# Patient Record
Sex: FEMALE | Race: BLACK OR AFRICAN AMERICAN | Employment: FULL TIME | URBAN - METROPOLITAN AREA
[De-identification: names, ages, dates, MRNs, and addresses within clinical notes are randomized per-mention and may not be internally consistent; named-entity substitution may affect disease eponyms.]

---

## 2021-03-26 ENCOUNTER — TRANSCRIBE ORDER (OUTPATIENT)
Dept: SCHEDULING | Age: 36
End: 2021-03-26

## 2021-03-26 DIAGNOSIS — D25.9 FIBROID, UTERINE: Primary | ICD-10-CM

## 2021-03-29 ENCOUNTER — HOSPITAL ENCOUNTER (OUTPATIENT)
Dept: MRI IMAGING | Age: 36
Discharge: HOME | End: 2021-03-29
Attending: STUDENT IN AN ORGANIZED HEALTH CARE EDUCATION/TRAINING PROGRAM
Payer: COMMERCIAL

## 2021-03-29 VITALS — WEIGHT: 163 LBS

## 2021-03-29 DIAGNOSIS — D25.9 FIBROID, UTERINE: ICD-10-CM

## 2021-03-29 PROCEDURE — 72197 MRI PELVIS W/O & W/DYE: CPT

## 2021-03-29 PROCEDURE — 74011250636 HC RX REV CODE- 250/636: Performed by: STUDENT IN AN ORGANIZED HEALTH CARE EDUCATION/TRAINING PROGRAM

## 2021-03-29 PROCEDURE — A9575 INJ GADOTERATE MEGLUMI 0.1ML: HCPCS | Performed by: STUDENT IN AN ORGANIZED HEALTH CARE EDUCATION/TRAINING PROGRAM

## 2021-03-29 RX ORDER — GADOTERATE MEGLUMINE 376.9 MG/ML
15 INJECTION INTRAVENOUS ONCE
Status: COMPLETED | OUTPATIENT
Start: 2021-03-29 | End: 2021-03-29

## 2021-03-29 RX ADMIN — GADOTERATE MEGLUMINE 15 ML: 376.9 INJECTION INTRAVENOUS at 17:03

## 2021-04-01 ENCOUNTER — HOSPITAL ENCOUNTER (OUTPATIENT)
Dept: INTERVENTIONAL RADIOLOGY/VASCULAR | Age: 36
Discharge: HOME | End: 2021-04-01
Attending: RADIOLOGY | Admitting: RADIOLOGY
Payer: COMMERCIAL

## 2021-04-01 VITALS
SYSTOLIC BLOOD PRESSURE: 131 MMHG | HEART RATE: 72 BPM | WEIGHT: 163 LBS | DIASTOLIC BLOOD PRESSURE: 76 MMHG | RESPIRATION RATE: 21 BRPM | TEMPERATURE: 99.5 F | BODY MASS INDEX: 28.88 KG/M2 | HEIGHT: 63 IN | OXYGEN SATURATION: 99 %

## 2021-04-01 DIAGNOSIS — D25.9 UTERINE FIBROID: ICD-10-CM

## 2021-04-01 LAB — HCG UR QL: NEGATIVE

## 2021-04-01 PROCEDURE — C1769 GUIDE WIRE: HCPCS

## 2021-04-01 PROCEDURE — C1894 INTRO/SHEATH, NON-LASER: HCPCS

## 2021-04-01 PROCEDURE — 74011250637 HC RX REV CODE- 250/637: Performed by: RADIOLOGY

## 2021-04-01 PROCEDURE — 77030003504 HC NDL BIOP TISS COOK -A

## 2021-04-01 PROCEDURE — 2709999900 HC NON-CHARGEABLE SUPPLY

## 2021-04-01 PROCEDURE — C1887 CATHETER, GUIDING: HCPCS

## 2021-04-01 PROCEDURE — 37243 VASC EMBOLIZE/OCCLUDE ORGAN: CPT

## 2021-04-01 PROCEDURE — 77030019569 HC BND COMPR RAD TERU -B

## 2021-04-01 PROCEDURE — 77030016064 HC PARTIC EMBSPHR BSPH -D

## 2021-04-01 PROCEDURE — 77030019698 HC SYR ANGI MDLON MRTM -A

## 2021-04-01 PROCEDURE — 77030003394 HC NDL ART COOK -A

## 2021-04-01 PROCEDURE — 74011000636 HC RX REV CODE- 636: Performed by: RADIOLOGY

## 2021-04-01 PROCEDURE — 74011000250 HC RX REV CODE- 250: Performed by: RADIOLOGY

## 2021-04-01 PROCEDURE — 77030014021 HC SYR ANGI FIX LOK MRTM -A

## 2021-04-01 PROCEDURE — 81025 URINE PREGNANCY TEST: CPT

## 2021-04-01 PROCEDURE — 74011250636 HC RX REV CODE- 250/636

## 2021-04-01 PROCEDURE — 74011250636 HC RX REV CODE- 250/636: Performed by: RADIOLOGY

## 2021-04-01 RX ORDER — FLUMAZENIL 0.1 MG/ML
0.5 INJECTION INTRAVENOUS ONCE
Status: DISCONTINUED | OUTPATIENT
Start: 2021-04-01 | End: 2021-04-01

## 2021-04-01 RX ORDER — IBUPROFEN 400 MG/1
800 TABLET ORAL
Status: COMPLETED | OUTPATIENT
Start: 2021-04-01 | End: 2021-04-01

## 2021-04-01 RX ORDER — NALOXONE HYDROCHLORIDE 0.4 MG/ML
0.4 INJECTION, SOLUTION INTRAMUSCULAR; INTRAVENOUS; SUBCUTANEOUS AS NEEDED
Status: DISCONTINUED | OUTPATIENT
Start: 2021-04-01 | End: 2021-04-01

## 2021-04-01 RX ORDER — ROPIVACAINE HYDROCHLORIDE 5 MG/ML
20 INJECTION, SOLUTION EPIDURAL; INFILTRATION; PERINEURAL
Status: COMPLETED | OUTPATIENT
Start: 2021-04-01 | End: 2021-04-01

## 2021-04-01 RX ORDER — VERAPAMIL HYDROCHLORIDE 2.5 MG/ML
2.5 INJECTION, SOLUTION INTRAVENOUS
Status: COMPLETED | OUTPATIENT
Start: 2021-04-01 | End: 2021-04-01

## 2021-04-01 RX ORDER — LIDOCAINE HCL/PF 100 MG/5ML
200 SYRINGE (ML) INTRAVENOUS
Status: COMPLETED | OUTPATIENT
Start: 2021-04-01 | End: 2021-04-01

## 2021-04-01 RX ORDER — MIDAZOLAM HYDROCHLORIDE 1 MG/ML
10 INJECTION, SOLUTION INTRAMUSCULAR; INTRAVENOUS
Status: DISCONTINUED | OUTPATIENT
Start: 2021-04-01 | End: 2021-04-01

## 2021-04-01 RX ORDER — OXYCODONE HCL 10 MG/1
10 TABLET, FILM COATED, EXTENDED RELEASE ORAL ONCE
Status: COMPLETED | OUTPATIENT
Start: 2021-04-01 | End: 2021-04-01

## 2021-04-01 RX ORDER — LIDOCAINE HCL/PF 100 MG/5ML
200 SYRINGE (ML) INTRAVENOUS ONCE
Status: COMPLETED | OUTPATIENT
Start: 2021-04-01 | End: 2021-04-01

## 2021-04-01 RX ORDER — NORETHINDRONE ACETATE AND ETHINYL ESTRADIOL 1; .02 MG/1; MG/1
1 TABLET ORAL DAILY
COMMUNITY

## 2021-04-01 RX ORDER — FENTANYL CITRATE 50 UG/ML
200 INJECTION, SOLUTION INTRAMUSCULAR; INTRAVENOUS
Status: DISCONTINUED | OUTPATIENT
Start: 2021-04-01 | End: 2021-04-01

## 2021-04-01 RX ORDER — MIDAZOLAM HYDROCHLORIDE 1 MG/ML
INJECTION, SOLUTION INTRAMUSCULAR; INTRAVENOUS
Status: COMPLETED
Start: 2021-04-01 | End: 2021-04-01

## 2021-04-01 RX ORDER — KETOROLAC TROMETHAMINE 30 MG/ML
30 INJECTION, SOLUTION INTRAMUSCULAR; INTRAVENOUS
Status: COMPLETED | OUTPATIENT
Start: 2021-04-01 | End: 2021-04-01

## 2021-04-01 RX ORDER — ONDANSETRON 2 MG/ML
8 INJECTION INTRAMUSCULAR; INTRAVENOUS AS NEEDED
Status: DISCONTINUED | OUTPATIENT
Start: 2021-04-01 | End: 2021-04-01

## 2021-04-01 RX ORDER — HYDROMORPHONE HYDROCHLORIDE 1 MG/ML
1 INJECTION, SOLUTION INTRAMUSCULAR; INTRAVENOUS; SUBCUTANEOUS
Status: DISCONTINUED | OUTPATIENT
Start: 2021-04-01 | End: 2021-04-01

## 2021-04-01 RX ORDER — LIDOCAINE 40 MG/G
CREAM TOPICAL
Status: COMPLETED | OUTPATIENT
Start: 2021-04-01 | End: 2021-04-01

## 2021-04-01 RX ORDER — ACETAMINOPHEN 500 MG
1000 TABLET ORAL
Status: COMPLETED | OUTPATIENT
Start: 2021-04-01 | End: 2021-04-01

## 2021-04-01 RX ORDER — SODIUM CHLORIDE 9 MG/ML
100 INJECTION, SOLUTION INTRAVENOUS CONTINUOUS
Status: DISCONTINUED | OUTPATIENT
Start: 2021-04-01 | End: 2021-04-01

## 2021-04-01 RX ORDER — TRIAMCINOLONE ACETONIDE 40 MG/ML
40 INJECTION, SUSPENSION INTRA-ARTICULAR; INTRAMUSCULAR ONCE
Status: COMPLETED | OUTPATIENT
Start: 2021-04-01 | End: 2021-04-01

## 2021-04-01 RX ORDER — HEPARIN SODIUM 1000 [USP'U]/ML
3000 INJECTION, SOLUTION INTRAVENOUS; SUBCUTANEOUS
Status: COMPLETED | OUTPATIENT
Start: 2021-04-01 | End: 2021-04-01

## 2021-04-01 RX ORDER — FENTANYL CITRATE 50 UG/ML
INJECTION, SOLUTION INTRAMUSCULAR; INTRAVENOUS
Status: COMPLETED
Start: 2021-04-01 | End: 2021-04-01

## 2021-04-01 RX ORDER — LIDOCAINE HYDROCHLORIDE 20 MG/ML
20 INJECTION, SOLUTION INFILTRATION; PERINEURAL
Status: COMPLETED | OUTPATIENT
Start: 2021-04-01 | End: 2021-04-01

## 2021-04-01 RX ORDER — SODIUM CHLORIDE 9 MG/ML
25 INJECTION, SOLUTION INTRAVENOUS CONTINUOUS
Status: DISCONTINUED | OUTPATIENT
Start: 2021-04-01 | End: 2021-04-01

## 2021-04-01 RX ADMIN — MIDAZOLAM HYDROCHLORIDE 0.5 MG: 1 INJECTION, SOLUTION INTRAMUSCULAR; INTRAVENOUS at 12:07

## 2021-04-01 RX ADMIN — WATER 2 G: 1 INJECTION INTRAMUSCULAR; INTRAVENOUS; SUBCUTANEOUS at 08:35

## 2021-04-01 RX ADMIN — OXYCODONE HYDROCHLORIDE 10 MG: 10 TABLET, FILM COATED, EXTENDED RELEASE ORAL at 08:35

## 2021-04-01 RX ADMIN — LIDOCAINE HYDROCHLORIDE 9 ML: 20 INJECTION, SOLUTION INFILTRATION; PERINEURAL at 11:13

## 2021-04-01 RX ADMIN — MIDAZOLAM HYDROCHLORIDE 0.5 MG: 1 INJECTION, SOLUTION INTRAMUSCULAR; INTRAVENOUS at 12:05

## 2021-04-01 RX ADMIN — MIDAZOLAM HYDROCHLORIDE 0.5 MG: 1 INJECTION, SOLUTION INTRAMUSCULAR; INTRAVENOUS at 11:10

## 2021-04-01 RX ADMIN — IOPAMIDOL 128 ML: 612 INJECTION, SOLUTION INTRAVENOUS at 12:10

## 2021-04-01 RX ADMIN — MIDAZOLAM HYDROCHLORIDE 0.5 MG: 1 INJECTION, SOLUTION INTRAMUSCULAR; INTRAVENOUS at 11:13

## 2021-04-01 RX ADMIN — METHYLPREDNISOLONE SODIUM SUCCINATE 125 MG: 125 INJECTION, POWDER, FOR SOLUTION INTRAMUSCULAR; INTRAVENOUS at 08:30

## 2021-04-01 RX ADMIN — IBUPROFEN 800 MG: 400 TABLET, FILM COATED ORAL at 13:26

## 2021-04-01 RX ADMIN — NITROGLYCERIN 1 INCH: 20 OINTMENT TOPICAL at 07:55

## 2021-04-01 RX ADMIN — LIDOCAINE HYDROCHLORIDE 100 MG: 20 INJECTION, SOLUTION INTRAVENOUS at 11:57

## 2021-04-01 RX ADMIN — SODIUM CHLORIDE 100 ML/HR: 900 INJECTION, SOLUTION INTRAVENOUS at 08:30

## 2021-04-01 RX ADMIN — ROPIVACAINE HYDROCHLORIDE 20 ML: 5 INJECTION, SOLUTION EPIDURAL; INFILTRATION; PERINEURAL at 12:05

## 2021-04-01 RX ADMIN — MIDAZOLAM HYDROCHLORIDE 1 MG: 1 INJECTION, SOLUTION INTRAMUSCULAR; INTRAVENOUS at 11:00

## 2021-04-01 RX ADMIN — LIDOCAINE HYDROCHLORIDE 100 MG: 20 INJECTION, SOLUTION INTRAVENOUS at 11:40

## 2021-04-01 RX ADMIN — MIDAZOLAM HYDROCHLORIDE 1 MG: 1 INJECTION, SOLUTION INTRAMUSCULAR; INTRAVENOUS at 10:55

## 2021-04-01 RX ADMIN — FENTANYL CITRATE 25 MCG: 50 INJECTION, SOLUTION INTRAMUSCULAR; INTRAVENOUS at 11:59

## 2021-04-01 RX ADMIN — MIDAZOLAM HYDROCHLORIDE 0.5 MG: 1 INJECTION, SOLUTION INTRAMUSCULAR; INTRAVENOUS at 12:02

## 2021-04-01 RX ADMIN — VERAPAMIL HYDROCHLORIDE 1.25 MG: 2.5 INJECTION, SOLUTION INTRAVENOUS at 11:16

## 2021-04-01 RX ADMIN — MIDAZOLAM HYDROCHLORIDE 0.5 MG: 1 INJECTION, SOLUTION INTRAMUSCULAR; INTRAVENOUS at 11:43

## 2021-04-01 RX ADMIN — HYDROMORPHONE HYDROCHLORIDE 1 MG: 1 INJECTION, SOLUTION INTRAMUSCULAR; INTRAVENOUS; SUBCUTANEOUS at 14:02

## 2021-04-01 RX ADMIN — ACETAMINOPHEN 1000 MG: 500 TABLET ORAL at 12:50

## 2021-04-01 RX ADMIN — FENTANYL CITRATE 50 MCG: 50 INJECTION, SOLUTION INTRAMUSCULAR; INTRAVENOUS at 10:55

## 2021-04-01 RX ADMIN — TRIAMCINOLONE ACETONIDE 40 MG: 40 INJECTION, SUSPENSION INTRA-ARTICULAR; INTRAMUSCULAR at 12:05

## 2021-04-01 RX ADMIN — NITROGLYCERIN 100 MCG: 10 INJECTION INTRAVENOUS at 11:16

## 2021-04-01 RX ADMIN — KETOROLAC TROMETHAMINE 30 MG: 30 INJECTION, SOLUTION INTRAMUSCULAR at 08:30

## 2021-04-01 RX ADMIN — LIDOCAINE 4%: 4 CREAM TOPICAL at 07:55

## 2021-04-01 RX ADMIN — ONDANSETRON 8 MG: 2 INJECTION INTRAMUSCULAR; INTRAVENOUS at 08:30

## 2021-04-01 RX ADMIN — FENTANYL CITRATE 12.5 MCG: 50 INJECTION, SOLUTION INTRAMUSCULAR; INTRAVENOUS at 11:54

## 2021-04-01 RX ADMIN — HEPARIN SODIUM 1500 UNITS: 1000 INJECTION INTRAVENOUS; SUBCUTANEOUS at 11:16

## 2021-04-01 RX ADMIN — FENTANYL CITRATE 12.5 MCG: 50 INJECTION, SOLUTION INTRAMUSCULAR; INTRAVENOUS at 12:04

## 2021-04-01 NOTE — DISCHARGE INSTRUCTIONS
[facility] [address] Uterine Fibroid Embolization Discharge Instructions    General Information:   Interventional Radiology can now treat fibroids using a well developed, non-surgical technique called embolization.   Embolization means to block the blood supply. This technique has been used for decades to shrink tumors elsewhere in the body and is now being applied to the treatment of fibroids with great success. Following embolization, the fibroid is deprived of its blood supply and it slowly shrinks. As the fibroid shrinks, excessive menstrual bleeding slows and pain often disappears. This procedure often helps patients to avoid hysterectomy and myomectomy. You will spend the night at the hospital after your procedure. You will be given a pain pump that you operate with a push button during your hospital stay. Home Care Instructions: You should not take a bath or go swimming for two weeks. Showering is acceptable. Please resume your normal activity slowly to see how you tolerate activity. Sexual or Athletic activity will not harm you, but may be uncomfortable. Every person recovers differently. If you are comfortable, then you may resume activity. Follow-Up Instructions:  Please see your ordering doctor as he/she has requested. Your OB/GYN may ask you to have a follow up ultrasound, or the interventional radiologist may ask you to have a follow up MRI to see how the fibroids responded to the embolization. This is a doctors preference, and is not indicated in all cases.     Call If:   Some patients may experience increased cramping pain and a low grade fever for 3-5 days after the procedure, which is normal.  You should call your Physician and/or the Radiology Nurse if you should develop any of the following: a foul smelling discharge, abnormal cramping or bleeding that you do not think is related to your normal menses, fever, and change in the character of your normal menses, or anything that concerns you. Radial Cardiac Catheterization / Angiography Discharge Instructions    It is normal to feel tired the first couple days. Take it easy and follow the physicians instructions. CHECK THE CATHETER INSERTION SITE DAILY:  Remove the wrist dressing 24 hours after the procedure. You may shower 24 hours after the procedure. Wash with soap and water and pat dry. Gentle cleaning of the site with soap and water is sufficient, cover with a dry clean dressing or bandage. Do not apply creams or powders to the area. No soaking the wrist for 3 days. Leave the puncture site open to air after 24 hours post-procedure. CALL THE PHYSICIAN:  If the site becomes red, swollen or feels warm to the touch. If there is bleeding or drainage or if there is unusual pain at the radial site. If there is any minor oozing, you may apply a band-aid and remove after 12 hours. If the bleeding continues, hold pressure with the middle finger against the puncture site and the thumb against the back of the wrist, call 911 to be transported to the hospital.  DO NOT DRIVE YOURSELF, [place] [address]. ACTIVITY:  For the first 24 hours do not manipulate the wrist.  No lifting, pushing or pulling over 3-5 pounds with the affected wrist for 7 days and no straining the insertion site. Do not lift grocery bags or the garbage can, do not run the vacuum  or  for 7 days. Start with short walks as in the hospital and gradually increase as tolerated each day. It is recommended to walk 30 minutes 5-7 days per week. Follow your physicians instructions on activity. Avoid walking outside in extremes of heat or cold. Walk inside when it is cold and windy or hot and humid. THINGS TO KEEP IN MIND:  No driving for at least 24 hours, or as designated by your physician.   Limit the number of times you go up and down the stairs  Take rests and pace yourself with activity. Be careful and do not strain with bowel movements. MEDICATIONS:  Take all medications as prescribed. Call your physician if you have any questions. Keep an updated list of your medications with you at all times and give a list to your physician and pharmacist.    To Reach Us:  Side effects of sedation medications and other medications used today have been reviewed. Notify us of nausea, itching, hives, dizziness, or anything else out of the ordinary. Should you experience any of these significant changes, please call [phone] between the hours of 7:30 am and 10 pm or [phone] after hours.  After hours, ask the  to page the [address] Technologist, and describe the problem to the technologist.        Patient Signature:  Date: 4/1/2021  Discharging Nurse: Christine Burkitt, RN

## 2021-04-01 NOTE — H&P
Interventional and Vascular Radiology History and Physical    Patient: Fiorella Hatch 37 y.o. female       Chief Complaint: No chief complaint on file. History of Present Illness: fibroids, menorrhagia     History:    History reviewed. No pertinent past medical history. History reviewed. No pertinent family history.   Social History     Socioeconomic History    Marital status: SINGLE     Spouse name: Not on file    Number of children: Not on file    Years of education: Not on file    Highest education level: Not on file   Occupational History    Not on file   Social Needs    Financial resource strain: Not on file    Food insecurity     Worry: Not on file     Inability: Not on file    Transportation needs     Medical: Not on file     Non-medical: Not on file   Tobacco Use    Smoking status: Unknown If Ever Smoked    Smokeless tobacco: Current User   Substance and Sexual Activity    Alcohol use: Not on file     Comment: once/week    Drug use: Not on file    Sexual activity: Not on file   Lifestyle    Physical activity     Days per week: Not on file     Minutes per session: Not on file    Stress: Not on file   Relationships    Social connections     Talks on phone: Not on file     Gets together: Not on file     Attends Orthodoxy service: Not on file     Active member of club or organization: Not on file     Attends meetings of clubs or organizations: Not on file     Relationship status: Not on file    Intimate partner violence     Fear of current or ex partner: Not on file     Emotionally abused: Not on file     Physically abused: Not on file     Forced sexual activity: Not on file   Other Topics Concern     Service Not Asked    Blood Transfusions Not Asked    Caffeine Concern Not Asked    Occupational Exposure Not Asked   [place] Hazards Not Asked    Sleep Concern Not Asked    Stress Concern Not Asked    Weight Concern Not Asked    Special Diet Not Asked    Back Care Not Asked    Exercise Not Asked    Bike Helmet Not Asked    Seat Belt Not Asked    Self-Exams Not Asked   Social History Narrative    Not on file       Allergies: No Known Allergies    Current Medications:  Current Facility-Administered Medications   Medication Dose Route Frequency    HYDROmorphone (PF) (DILAUDID) injection 1 mg  1 mg IntraVENous Q2H PRN    ondansetron (ZOFRAN) injection 8 mg  8 mg IntraVENous PRN    promethazine (PHENERGAN) 12.5 mg in 0.9% sodium chloride 50 mL IVPB  12.5 mg IntraVENous PRN    0.9% sodium chloride infusion  100 mL/hr IntraVENous CONTINUOUS    fentaNYL citrate (PF) injection 200 mcg  200 mcg IntraVENous Multiple    midazolam (VERSED) injection 10 mg  10 mg IntraVENous Multiple    0.9% sodium chloride infusion  25 mL/hr IntraVENous CONTINUOUS    flumazeniL (ROMAZICON) 0.1 mg/mL injection 0.5 mg  0.5 mg IntraVENous ONCE    naloxone (NARCAN) injection 0.4 mg  0.4 mg IntraVENous PRN        Physical Exam:  Blood pressure 116/69, pulse 64, temperature 99.5 °F (37.5 °C), resp. rate 18, height 5' 3\" (1.6 m), weight 73.9 kg (163 lb), SpO2 98 %, not currently breastfeeding. LUNG: clear to auscultation bilaterally, HEART: regular rate and rhythm, S1, S2 normal, no murmur, click, rub or gallop      Alerts:      Laboratory:    No results for input(s): HGB, HCT, WBC, PLT, INR, BUN, CREA, K, CRCLT, HGBEXT, HCTEXT, PLTEXT, INREXT in the last 72 hours. No lab exists for component: PTT, PT      Plan of Care/Planned Procedure:  Risks, benefits, and alternatives reviewed with patient and she agrees to proceed with the procedure. Conscious sedation will be performed with IV fentanyl and versed.  Plan is for uterine artery embolization       Doris Rodriguez MD

## 2021-04-01 NOTE — PROGRESS NOTES
0730 Pt arrives to angio department accompanied by sister for transradial uterine fibroid embolization procedure. All assessments completed and consent was reviewed. Education given was regarding procedure, IV conscious sedation, post-procedure care and  management/follow-up. Opportunity for questions was provided and all questions and concerns were addressed. Dr. Mallory Tena assessed LUE radial and ulnar pulses for radial access. POC pregnancy test negative. 1620 Pt has ambulated up to BR three times without dizziness or nausea. VSS  Has had abdominal cramping which has ranged from a 6; now it's down to a 2. Has received dilaudid 1mg IV, 1000mg tylenol po, and 800mg motrin po postprocedure. She has tolerated po without problems. LUE TR band removed with dsg dry and intact at the site. Reviewed discharged instructions with pt, as did Dr. Mallory Tena. Pt verbalized understanding of instructions. Pt discharged to home with belongings, her medications prescribed by Dr. Mallory Tena, and instructions as well as Dr. Mallory Tena contact card via w/c with sister. Pt has LUE immobilizer intact to wrist at discharge and emphasized use of it for the next three days. Also reiterated activity restrictions with pt.

## 2021-04-02 ENCOUNTER — APPOINTMENT (OUTPATIENT)
Dept: CT IMAGING | Age: 36
End: 2021-04-02
Attending: STUDENT IN AN ORGANIZED HEALTH CARE EDUCATION/TRAINING PROGRAM
Payer: COMMERCIAL

## 2021-04-02 ENCOUNTER — HOSPITAL ENCOUNTER (EMERGENCY)
Age: 36
Discharge: HOME | End: 2021-04-02
Attending: EMERGENCY MEDICINE | Admitting: EMERGENCY MEDICINE
Payer: COMMERCIAL

## 2021-04-02 VITALS
SYSTOLIC BLOOD PRESSURE: 165 MMHG | DIASTOLIC BLOOD PRESSURE: 95 MMHG | RESPIRATION RATE: 18 BRPM | HEART RATE: 57 BPM | OXYGEN SATURATION: 100 % | TEMPERATURE: 98 F

## 2021-04-02 DIAGNOSIS — R10.814 LEFT LOWER QUADRANT ABDOMINAL TENDERNESS WITHOUT REBOUND TENDERNESS: ICD-10-CM

## 2021-04-02 DIAGNOSIS — R10.2 PELVIC PAIN: Primary | ICD-10-CM

## 2021-04-02 DIAGNOSIS — R16.0 LIVER MASS: ICD-10-CM

## 2021-04-02 LAB
ALBUMIN SERPL-MCNC: 3.7 G/DL (ref 3.5–5)
ALBUMIN/GLOB SERPL: 0.9 {RATIO} (ref 1.1–2.2)
ALP SERPL-CCNC: 52 U/L (ref 45–117)
ALT SERPL-CCNC: 23 U/L (ref 12–78)
ANION GAP SERPL CALC-SCNC: 6 MMOL/L (ref 5–15)
APPEARANCE UR: CLEAR
AST SERPL-CCNC: 16 U/L (ref 15–37)
BACTERIA URNS QL MICRO: NEGATIVE /HPF
BASOPHILS # BLD: 0 K/UL (ref 0–0.1)
BASOPHILS NFR BLD: 0 % (ref 0–1)
BILIRUB SERPL-MCNC: 0.2 MG/DL (ref 0.2–1)
BILIRUB UR QL: NEGATIVE
BUN SERPL-MCNC: 9 MG/DL (ref 6–20)
BUN/CREAT SERPL: 12 (ref 12–20)
CALCIUM SERPL-MCNC: 8.9 MG/DL (ref 8.5–10.1)
CHLORIDE SERPL-SCNC: 105 MMOL/L (ref 97–108)
CO2 SERPL-SCNC: 24 MMOL/L (ref 21–32)
COLOR UR: ABNORMAL
CREAT SERPL-MCNC: 0.78 MG/DL (ref 0.55–1.02)
DIFFERENTIAL METHOD BLD: ABNORMAL
EOSINOPHIL # BLD: 0 K/UL (ref 0–0.4)
EOSINOPHIL NFR BLD: 0 % (ref 0–7)
EPITH CASTS URNS QL MICRO: ABNORMAL /LPF
ERYTHROCYTE [DISTWIDTH] IN BLOOD BY AUTOMATED COUNT: 12.7 % (ref 11.5–14.5)
GLOBULIN SER CALC-MCNC: 4 G/DL (ref 2–4)
GLUCOSE SERPL-MCNC: 125 MG/DL (ref 65–100)
GLUCOSE UR STRIP.AUTO-MCNC: NEGATIVE MG/DL
HCT VFR BLD AUTO: 38.3 % (ref 35–47)
HGB BLD-MCNC: 12.6 G/DL (ref 11.5–16)
HGB UR QL STRIP: ABNORMAL
IMM GRANULOCYTES # BLD AUTO: 0.1 K/UL (ref 0–0.04)
IMM GRANULOCYTES NFR BLD AUTO: 0 % (ref 0–0.5)
KETONES UR QL STRIP.AUTO: NEGATIVE MG/DL
LEUKOCYTE ESTERASE UR QL STRIP.AUTO: ABNORMAL
LYMPHOCYTES # BLD: 1.1 K/UL (ref 0.8–3.5)
LYMPHOCYTES NFR BLD: 6 % (ref 12–49)
MCH RBC QN AUTO: 27.6 PG (ref 26–34)
MCHC RBC AUTO-ENTMCNC: 32.9 G/DL (ref 30–36.5)
MCV RBC AUTO: 83.8 FL (ref 80–99)
MONOCYTES # BLD: 0.8 K/UL (ref 0–1)
MONOCYTES NFR BLD: 4 % (ref 5–13)
NEUTS SEG # BLD: 17.1 K/UL (ref 1.8–8)
NEUTS SEG NFR BLD: 90 % (ref 32–75)
NITRITE UR QL STRIP.AUTO: NEGATIVE
NRBC # BLD: 0 K/UL (ref 0–0.01)
NRBC BLD-RTO: 0 PER 100 WBC
PH UR STRIP: 6.5 [PH] (ref 5–8)
PLATELET # BLD AUTO: 207 K/UL (ref 150–400)
PMV BLD AUTO: 11.9 FL (ref 8.9–12.9)
POTASSIUM SERPL-SCNC: 3.8 MMOL/L (ref 3.5–5.1)
PROT SERPL-MCNC: 7.7 G/DL (ref 6.4–8.2)
PROT UR STRIP-MCNC: NEGATIVE MG/DL
RBC # BLD AUTO: 4.57 M/UL (ref 3.8–5.2)
RBC #/AREA URNS HPF: ABNORMAL /HPF (ref 0–5)
SODIUM SERPL-SCNC: 135 MMOL/L (ref 136–145)
SP GR UR REFRACTOMETRY: 1.01 (ref 1–1.03)
UR CULT HOLD, URHOLD: NORMAL
UROBILINOGEN UR QL STRIP.AUTO: 0.2 EU/DL (ref 0.2–1)
WBC # BLD AUTO: 19.2 K/UL (ref 3.6–11)
WBC URNS QL MICRO: ABNORMAL /HPF (ref 0–4)

## 2021-04-02 PROCEDURE — 74177 CT ABD & PELVIS W/CONTRAST: CPT

## 2021-04-02 PROCEDURE — 85025 COMPLETE CBC W/AUTO DIFF WBC: CPT

## 2021-04-02 PROCEDURE — 80053 COMPREHEN METABOLIC PANEL: CPT

## 2021-04-02 PROCEDURE — 74011000636 HC RX REV CODE- 636: Performed by: RADIOLOGY

## 2021-04-02 PROCEDURE — 74011250636 HC RX REV CODE- 250/636: Performed by: STUDENT IN AN ORGANIZED HEALTH CARE EDUCATION/TRAINING PROGRAM

## 2021-04-02 PROCEDURE — 81001 URINALYSIS AUTO W/SCOPE: CPT

## 2021-04-02 PROCEDURE — 99283 EMERGENCY DEPT VISIT LOW MDM: CPT

## 2021-04-02 PROCEDURE — 96374 THER/PROPH/DIAG INJ IV PUSH: CPT

## 2021-04-02 PROCEDURE — 36415 COLL VENOUS BLD VENIPUNCTURE: CPT

## 2021-04-02 PROCEDURE — 96375 TX/PRO/DX INJ NEW DRUG ADDON: CPT

## 2021-04-02 RX ORDER — MORPHINE SULFATE 4 MG/ML
4 INJECTION INTRAVENOUS ONCE
Status: COMPLETED | OUTPATIENT
Start: 2021-04-02 | End: 2021-04-02

## 2021-04-02 RX ORDER — KETOROLAC TROMETHAMINE 30 MG/ML
30 INJECTION, SOLUTION INTRAMUSCULAR; INTRAVENOUS
Status: COMPLETED | OUTPATIENT
Start: 2021-04-02 | End: 2021-04-02

## 2021-04-02 RX ADMIN — IOPAMIDOL 100 ML: 755 INJECTION, SOLUTION INTRAVENOUS at 11:34

## 2021-04-02 RX ADMIN — KETOROLAC TROMETHAMINE 30 MG: 30 INJECTION, SOLUTION INTRAMUSCULAR at 08:46

## 2021-04-02 RX ADMIN — PROCHLORPERAZINE EDISYLATE 10 MG: 5 INJECTION INTRAMUSCULAR; INTRAVENOUS at 08:46

## 2021-04-02 RX ADMIN — MORPHINE SULFATE 4 MG: 4 INJECTION, SOLUTION INTRAMUSCULAR; INTRAVENOUS at 10:03

## 2021-04-02 NOTE — DISCHARGE INSTRUCTIONS
Please take medications as prescribed:     Alternate Tylenol and Toradol as needed for pain. You can take   Tylenol: You can take 650 to 1000 milligrams (mg) every 4 to 6 hours as needed. Dose is based on form and strength. Max dose: 4g in 24 hours. Toradol: You can take 10mg every 6 hours PRN pain. Do not take more than prescribed. (once toradol prescription has been completed, you can substitute Ibuprofen or Aleve for toradol)     If you have severe pain, take oxycodone as prescribed. This should be used in conjunction with other pain medications. Take Zofran (ondansetron) as needed for nausea. Take ciprofloxacin (antibiotic) daily as prescribed until prescription is complete.

## 2021-04-02 NOTE — ED PROVIDER NOTES
Patient is a 29-year-old female with past medical history of uterine artery embolization performed yesterday 4/1/21 by Dr. Loki Ascencio who presents to ED c/o lower abdominal pain. Patient reports she was discharged last night, and doing well prior to d/c rating pain a 2/10. Patient reports once she got home and tried to go to sleep, pain increased and has been constant. Patient reports pain to lower abdomen described as cramping rating pain 10/10. Patient reports she was unable to sleep secondary to pain. Patient reports she was nauseous earlier and had one episode of NBNB emesis. Patient reports she also had difficulty urinating yesterday, but today she urinated without difficulty ~20mins ago. Patient's mother reports she had given patient multiple medications at 6am in attempt to help relieve pain w/o success. Mother reports she gave patient zofran 8mg, Ciprofloxacin 500mg, Oxycodone 10mg and Oxycodone with acetaminophen 5/325mg all at [place]. Mother reports they were not told how to administer pain medications. Patient reports she also started her menstrual cycle yesterday and that she typically has severe cramps with her menstrual cycle. Patient denies any fever, chills, diarrhea, decreased urination, dysuria, chest pain, shortness of breath, difficulty breathing, headache and syncope. No past medical history on file. Past Surgical History:   Procedure Laterality Date    IR Rc [facility] ORGAN W SI  4/1/2021         No family history on file.     Social History     Socioeconomic History    Marital status: SINGLE     Spouse name: Not on file    Number of children: Not on file    Years of education: Not on file    Highest education level: Not on file   Occupational History    Not on file   Social Needs    Financial resource strain: Not on file    Food insecurity     Worry: Not on file     Inability: Not on file    Transportation needs     Medical: Not on file     Non-medical: Not on file   Tobacco Use    Smoking status: Unknown If Ever Smoked    Smokeless tobacco: Current User   Substance and Sexual Activity    Alcohol use: Not on file     Comment: once/week    Drug use: Not on file    Sexual activity: Not on file   Lifestyle    Physical activity     Days per week: Not on file     Minutes per session: Not on file    Stress: Not on file   Relationships    Social connections     Talks on phone: Not on file     Gets together: Not on file     Attends Taoism service: Not on file     Active member of club or organization: Not on file     Attends meetings of clubs or organizations: Not on file     Relationship status: Not on file    Intimate partner violence     Fear of current or ex partner: Not on file     Emotionally abused: Not on file     Physically abused: Not on file     Forced sexual activity: Not on file   Other Topics Concern     Service Not Asked    Blood Transfusions Not Asked    Caffeine Concern Not Asked    Occupational Exposure Not Asked   [place] Hazards Not Asked    Sleep Concern Not Asked    Stress Concern Not Asked    Weight Concern Not Asked    Special Diet Not Asked    Back Care Not Asked    Exercise Not Asked    Bike Helmet Not Asked   [address] Not Asked    Self-Exams Not Asked   Social History Narrative    Not on file         ALLERGIES: Patient has no known allergies. Review of Systems   Constitutional: Negative for chills, fatigue and fever. HENT: Negative for congestion and sore throat. Eyes: Negative for pain and discharge. Respiratory: Negative for cough and shortness of breath. Cardiovascular: Negative for chest pain. Gastrointestinal: Positive for abdominal pain, nausea and vomiting. Negative for diarrhea. Genitourinary: Positive for difficulty urinating and pelvic pain. Negative for decreased urine volume, dysuria, flank pain and urgency. Musculoskeletal: Negative for back pain and neck pain. Skin: Negative for rash. Allergic/Immunologic: Negative for immunocompromised state. Neurological: Negative for syncope, weakness and headaches. Psychiatric/Behavioral: Negative for confusion. All other systems reviewed and are negative. There were no vitals filed for this visit. Physical Exam  Vitals signs and nursing note reviewed. Constitutional:       General: She is not in acute distress. Appearance: She is well-developed. She is not toxic-appearing. HENT:      Head: Normocephalic and atraumatic. Nose: Nose normal.      Mouth/Throat:      Mouth: Mucous membranes are moist.   Eyes:      General: Lids are normal.      Extraocular Movements: Extraocular movements intact. Conjunctiva/sclera: Conjunctivae normal.   Neck:      Musculoskeletal: Normal range of motion and neck supple. Cardiovascular:      Rate and Rhythm: Normal rate and regular rhythm. Pulses: Normal pulses. Heart sounds: Normal heart sounds, S1 normal and S2 normal.   Pulmonary:      Effort: Pulmonary effort is normal. No accessory muscle usage. Breath sounds: Normal breath sounds. Abdominal:      General: Abdomen is flat. Bowel sounds are normal.      Palpations: Abdomen is soft. Tenderness: There is no right CVA tenderness, left CVA tenderness, guarding or rebound. Comments: Lower abdominal tenderness noted. No rebound or guarding. Musculoskeletal: Normal range of motion. Skin:     General: Skin is warm and dry. Capillary Refill: Capillary refill takes less than 2 seconds. Neurological:      General: No focal deficit present. Mental Status: She is alert and oriented to person, place, and time. Mental status is at baseline. Psychiatric:         Attention and Perception: Attention normal.         Mood and Affect: Mood and affect normal.         Speech: Speech normal.         Behavior: Behavior is cooperative. Thought Content:  Thought content normal.         Cognition and Memory: Cognition normal.         Judgment: Judgment normal.          MDM  Number of Diagnoses or Management Options  Left lower quadrant abdominal tenderness without rebound tenderness  Liver mass  Pelvic pain  Diagnosis management comments: Patient is POD1 uterine artery embolization performed by Dr. Mallory Tena due to symptomatic fibroids. She was doing well when d/c and once she got home her abdominal pain increased keeping her up all night. She also had nausea and one episode of NBNB emesis. She reports difficulty urinating yesterday, but states she last urinated 20 minutes ago without difficulty. Currently rates pain 10/10. Her mother had given her multiple medications at 6 am because they were unsure which ones were for pain vs. Antibiotics. VSS, she has lower abdominal tenderness without rebound or guarding. Will order basic labs and give dose of toradol now. Patient continued to be in pain despite toradol and morphine. Wbc count 19.2 with left shift likely due to surgery but will consult Dr. Mallory Tena and order CT abdomen/pelvis. CT was negative. Dr. Mallory Tena aware patient was present and his PA will plan to see patient. Patient reports her pain has improved. Provided patient with information on how to properly take medications. Return to ER warnings discussed in detail. Amount and/or Complexity of Data Reviewed  Clinical lab tests: reviewed  Tests in the radiology section of CPT®: reviewed  Review and summarize past medical records: yes  Discuss the patient with other providers: yes (Dr. Leah Gipson, ED Attending )      ED Course as of Apr 02 1624 Fri Apr 02, 2021   5498 CBC WITH AUTOMATED DIFF(!):    WBC 19.2(!)   RBC 4.57   HGB 12.6   HCT 38.3   MCV 83.8   MCH 27.6   MCHC 32.9   RDW 12.7   PLATELET 094   MPV 09.0   NRBC 0.0   ABSOLUTE NRBC 0.00   NEUTROPHILS 90(!)   LYMPHOCYTES 6(!)   MONOCYTES 4(!)   EOSINOPHILS 0   BASOPHILS 0   IMMATURE GRANULOCYTES 0   ABS. NEUTROPHILS 17.1(!)   ABS. LYMPHOCYTES 1.1   ABS. MONOCYTES 0.8   ABS. EOSINOPHILS 0.0   ABS. BASOPHILS 0.0   ABS. IMM.  GRANS. 0.1(!)   DF AUTOMATED [KG]      ED Course User Index  [KG] Gena Baldwin

## 2021-04-02 NOTE — ED TRIAGE NOTES
Patient pain in the lower left quadrant with vomiting since yesterday. She was a transradial uterine fibroid embolization procedure done yesterday.

## 2021-04-02 NOTE — CONSULTS
INTERVENTIONAL RADIOLOGY  Consult Note      Patient:  Bro Conway  :  1985  Age:  37 y.o. MRN:  [id number]    Today's Date:  2021  Admission Date:  2021  Hospital Day:  0  Consult requested by:  BRANDY Mejia      CC / HPI     Chief Complaint   Patient presents with    Vomiting     Bro Conway is a 37 y.o. female with a history of menorrhagia with uterine fibroids and dysmenorrhea who presented to the ED with worsening n/v and lower abdominal pain. The patient is POD#1 s/p uterine fibroid embolization. She reports starting her menstrual cycle yesterday. She was discharged on multiple medications including Ciprofloxacin, Zofran, Oxycodone, Percocet and Toradol all of which she apparently took at the same time early this morning. CT a/p showed uterine fibroids with post embolization enhancement that is expected post embolization and hepatic lesions c/w focal nodular hyperplasia. The patient denies fever, chills, shortness of breath, chest pain, or changes in bowel or bladder. She reports her pain improved somewhat after receiving pain medication in the ED, although she is concerned it will return once the medications wear off. PAST MEDICAL HISTORY  History reviewed. No pertinent past medical history.     PAST SURGICAL HISTORY  Past Surgical History:   Procedure Laterality Date    IR Deja Kerns ORGAN W SI  2021       SOCIAL HISTORY  Social History     Socioeconomic History    Marital status: SINGLE     Spouse name: Not on file    Number of children: Not on file    Years of education: Not on file    Highest education level: Not on file   Occupational History    Not on file   Social Needs    Financial resource strain: Not on file    Food insecurity     Worry: Not on file     Inability: Not on file    Transportation needs     Medical: Not on file     Non-medical: Not on file   Tobacco Use    Smoking status: Unknown If Ever Smoked    Smokeless tobacco: Current User Substance and Sexual Activity    Alcohol use: Not on file     Comment: once/week    Drug use: Not on file    Sexual activity: Not on file   Lifestyle    Physical activity     Days per week: Not on file     Minutes per session: Not on file    Stress: Not on file   Relationships    Social connections     Talks on phone: Not on file     Gets together: Not on file     Attends Congregational service: Not on file     Active member of club or organization: Not on file     Attends meetings of clubs or organizations: Not on file     Relationship status: Not on file    Intimate partner violence     Fear of current or ex partner: Not on file     Emotionally abused: Not on file     Physically abused: Not on file     Forced sexual activity: Not on file   Other Topics Concern     Service Not Asked    Blood Transfusions Not Asked    Caffeine Concern Not Asked    Occupational Exposure Not Asked   Jhonny Edmund Hazards Not Asked    Sleep Concern Not Asked    Stress Concern Not Asked    Weight Concern Not Asked    Special Diet Not Asked    Back Care Not Asked    Exercise Not Asked    Bike Helmet Not Asked   [address] [facility] Not Asked    Self-Exams Not Asked   Social History Narrative    Not on file       FAMILY HISTORY  History reviewed. No pertinent family history. CURRENT MEDICATIONS  Current Outpatient Medications   Medication Sig Dispense Refill    norethindrone-ethinyl estradiol (Junel 1/20, 21,) 1-20 mg-mcg tablet Take 1 Tab by mouth daily. ALLERGIES  No Known Allergies    DIAGNOSTIC STUDIES   IMAGING STUDIES  Relevant Imaging studies reviewed    CT Results:  Results from Hospital Encounter encounter on 04/02/21   CT ABD PELV W CONT    Narrative EXAM: CT ABD PELV W CONT    INDICATION: severe abdominal pain. POD1 uterine artery embolization    COMPARISON: UFE 4/1/2021. Pelvis MRI 3/29/2021     CONTRAST: 100 mL of Isovue-370.     TECHNIQUE:   Following the uneventful intravenous administration of contrast, thin axial  images were obtained through the abdomen and pelvis. Coronal and sagittal  reconstructions were generated. Oral contrast was not administered. CT dose  reduction was achieved through use of a standardized protocol tailored for this  examination and automatic exposure control for dose modulation. FINDINGS:   LOWER THORAX: No significant abnormality in the incidentally imaged lower chest.  LIVER: There is a 4.1 x 4.9 cm enhancing mass in the medial left hepatic lobe. This has a central scar. This most likely represents focal nodular hyperplasia. There is a 1.6 cm enhancing lesion in the posterior right hepatic lobe. There is  no central scar. BILIARY TREE: There is vicarious excretion of contrast in the gallbladder. CBD  is not dilated. SPLEEN: within normal limits. PANCREAS: No mass or ductal dilatation. ADRENALS: Unremarkable. KIDNEYS: No mass, calculus, or hydronephrosis. STOMACH: Unremarkable. SMALL BOWEL: No dilatation or wall thickening. COLON: No dilatation or wall thickening. APPENDIX: Unremarkable  PERITONEUM: No ascites or pneumoperitoneum. RETROPERITONEUM: No lymphadenopathy or aortic aneurysm. REPRODUCTIVE ORGANS: There is a 9.4 cm uterine fibroid. This has contrast within  it which is expected postprocedural. An additional fibroid is seen in the right  base measuring 2.1 cm. This also has contrast within it. URINARY BLADDER: No mass or calculus. BONES: No destructive bone lesion. ABDOMINAL WALL: No mass or hernia. ADDITIONAL COMMENTS: N/A      Impression 1. Uterine fibroids with enhancement that is expected post embolization. 2. 4.9 cm lesion in the medial left hepatic lobe consistent with focal nodular  hyperplasia. A 1.6 cm enhancing lesion in the posterior right hepatic lobe is  nonspecific, but likely represents another FNH given the presence of the larger  one.      IR Results:  Results from [place] encounter on 04/01/21   IR OCCL TXCATH ORGAN W SI Narrative PROCEDURE:  1. Uterine artery embolization  2. Superior hypogastric nerve block    PRE PROCEDURE DIAGNOSIS: Fibroids, menorrhagia    POST PROCEDURE DIAGNOSIS: SAME     OPERATING PHYSICIAN: Jeff Rodrigues M.D.    ESTIMATED BLOOD LOSS: Minimal    SPECIMENS REMOVED: None    FLUOROSCOPY DOSE (air kerma): 762 mGy    COMPLICATIONS: None immediate. Procedure and findings:     Informed consent was obtained and risks were explained which included infection,  bleeding, and nontarget embolization    Prophylactic antibiotic of Ancef 2 g was administered prior to the intervention  and discontinued prior to the completion of the procedure. Moderate intravenous conscious sedation was supervised by Dr. Cecy Cox. The  patient was independently monitored by a registered nurse assigned to the  Department of Radiology using automated blood pressure, EKG, and pulse oximetry. The detail conscious sedation record is stored in the hospital information  system. Medication:  Versed: 5 mg  Fentanyl: 100 mcg  Intraprocedure time: 58 minutes    The left wrist was prepped and draped in maximal sterile fashion. Micropuncture  access was obtained into the left radial artery under ultrasound guidance. Sonographic images demonstrates normal-sized left radial artery. A short 5  Greenlandic vascular sheath was placed. A 5 [place]ic angled catheter was advanced into the right internal iliac artery and  a dedicated arteriogram was performed    Right internal iliac arteriogram: Patent artery with patent enlarged right  uterine artery    Next, 5 [place] Emmanuelle Barth catheter was advanced near the origin of the right  uterine artery. A microcatheter was advanced into the right uterine artery under  fluoroscopic guidance. A right uterine arteriogram was performed. Right uterine arteriogram: Enlarged, tortuous and patent right uterine artery  without evidence for AV fistula.     Embolization was subsequently performed under fluoroscopic guidance. A total of  2  vials of the 500-700 micron size embospheres were used until near stasis was  achieved. Embolization endpoint (right uterine artery): Near-stasis (not static, contrast  visible for at least 5 heartbeats). Variant uterine anatomy (right uterine artery): Not applicable-normal uterine  artery anatomy    Next, the 5 [place] angled catheter was advanced into the left internal iliac  artery and a dedicated arteriogram was performed    Left internal iliac arteriogram: Patent artery with patent and enlarged left  uterine artery    Next, 5 [place] Barth catheter was advanced near the origin of the left uterine  artery. A microcatheter was advanced into the left uterine artery under  fluoroscopic guidance. A left uterine arteriogram was performed. Left uterine arteriogram: Enlarged and patent left uterine artery without  evidence for AV fistula. Embolization was subsequently performed under fluoroscopic guidance. A total of  2  vials of the 500-700 micron size embospheres were used until near stasis was  achieved. Embolization endpoint (left uterine artery): Near-stasis (not static, contrast  visible for at least 5 heartbeats). Variant uterine anatomy (left uterine artery): Not applicable-normal uterine  artery anatomy    The lower anterior abdomen was prepped in maximal sterile barrier technique. The  aortic bifurcation position was confirmed under fluoroscopy. Subsequent, a  22-gauge, 15 cm Chiba needle was advanced from an anterior abdominal approach  down to the anterior aspect of the L5 vertebral body, below the aortic  bifurcation. Contrast injection confirmed extravascular position of the needle. Subsequently, superior hypogastric nerve block was performed with slow injection  of 20 cc of 0.5% ropivacaine mixed with 40 mg of Kenalog. Patient tolerated the  injection well without immediate complication. Vitals remained stable. The procedure was subsequently terminated. Vascular closure was obtained at the  left radial artery access site using a air compression band. No complication. A  dry sterile dressing was applied. Impression Bilateral uterine artery embolization was performed for symptomatic fibroids.     CC: Dr. Destini Kinsey MD             LABS  Lab Results   Component Value Date/Time    WBC 19.2 (H) 04/02/2021 08:39 AM    HGB 12.6 04/02/2021 08:39 AM    HCT 38.3 04/02/2021 08:39 AM    PLATELET 295 67/47/9934 08:39 AM    MCV 83.8 04/02/2021 08:39 AM     Lab Results   Component Value Date/Time    Sodium 135 (L) 04/02/2021 08:39 AM    Potassium 3.8 04/02/2021 08:39 AM    Chloride 105 04/02/2021 08:39 AM    CO2 24 04/02/2021 08:39 AM    Anion gap 6 04/02/2021 08:39 AM    Glucose 125 (H) 04/02/2021 08:39 AM    BUN 9 04/02/2021 08:39 AM    Creatinine 0.78 04/02/2021 08:39 AM    BUN/Creatinine ratio 12 04/02/2021 08:39 AM    GFR est AA >60 04/02/2021 08:39 AM    GFR est non-AA >60 04/02/2021 08:39 AM    Calcium 8.9 04/02/2021 08:39 AM     No results found for: INR, PTMR, PTP, PT1, PT2, INREXT    REVIEW OF SYSTEMS   (Positive findings are bolded)  General:  Fever, Chills, Sweats, Anorexia, Unintentional weight loss, Generalized weakness, Fatigue  HEENT:  Vision change, Vision loss, Eye pain, Discharge, Photophobia, Tinnitus, Hearing loss, Nasal congestion, Rhinorrhea, Epistaxis, Hoarseness, Dysphagia  Cardiovascular:  Chest pain, Palpitations, Syncope, Paroxysmal nocturnal dyspnea, Orthopnea, Dyspnea on exertion, Leg swelling, Claudication  Respiratory:  Cough, SOB, Sputum production, Hemoptysis, Wheezing, Snoring  Gastrointestinal:  Nausea, Vomiting, Abdominal pain, Dyspepsia, Diarrhea, Constipation, Incontinence of stool, Melena, Hematochezia  Genitourinary:  Dysuria, Hematuria, Frequency, Urgency, Incontinence of urine, Nocturia  Musculoskeletal:  Back pain, Joint pain, Joint swelling, Muscle spasm, Weakness, Stiffness  Integumentary:  Rash, Itching, Dryness, Discoloration, Open wound  Neurological:  Numbness, Tingling, Seizure, Tremor, Dizziness, Headache, Memory loss  Psychiatric:  Depression, Anxiety, Psychosis, Paranoia, Hallucinations, Suicidal ideations, Homicidal ideations  Endocrine:  Cold intolerance, Heat intolerance, Excessive thirst, Excessive Hunger  Hematologic / Lymphatic:  Abnormal bruising, Bleeding, Lymphadenopathy  Allergic / Immunologic / Infectious:  Urticaria, Seasonal allergies, Persistent / recurrent infection    PHYSICAL EXAM   BP (!) 165/95 (BP 1 Location: Left upper arm, BP Patient Position: At rest)   Pulse (!) 57   Temp 98 °F (36.7 °C)   Resp 18   SpO2 100%   General:  Non-toxic appearing female, NAD  HEENT:  NCAT, EOMI, conjunctiva anicteric, MMM  Heart:  RRR, S1S2 normal  Lungs:  CTAB, NWOB  Abdomen:  Soft, ND, lower quadrants mildly TTP, no rebound or guarding  Extremities:  MAEW, no cyanosis or edema  Skin:  Warm and dry, normal color, no rashes  Neurological:  AAOX3, speech clear and coherent    ASSESSMENT   This is a 37 y.o. female with n/v and lower abdominal pain s/p UFE and dysmenorrhea. PLAN   Chart and imaging reviewed. Patient's pain appears to be under control at this time. Discussed with patient that some lower abdominal discomfort s/p UFE is to be expected. Her usual dysmenorrhea is probably contributing to this as well as she just started her menstrual cycle. Discharge medications reviewed and patient instructed on how to properly take these. Patient also advised she can take OTC acetaminophen as needed, as long as she keeps the combined OTC acetaminophen + Percocet acetaminophen total < 3 grams / day. Case discussed with Dr. Gabriel Tran. Thank you for asking us to participate in the care of this patient.       Reyna Verduzco, SYLVAIN  [facility]

## 2023-05-26 RX ORDER — NORETHINDRONE ACETATE AND ETHINYL ESTRADIOL 1; .02 MG/1; MG/1
1 TABLET ORAL DAILY
COMMUNITY

## 2024-02-28 NOTE — PERIOP NOTE
SPOKE WITH SUGEY AT THE SURGEONS OFFICE AND SHE STATED PAT NOT NEEDED AND PHONE INTERVIEW WOULD BE FINE

## 2024-02-29 ENCOUNTER — ANESTHESIA EVENT (OUTPATIENT)
Facility: HOSPITAL | Age: 39
End: 2024-02-29
Payer: COMMERCIAL

## 2024-03-01 ENCOUNTER — ANESTHESIA (OUTPATIENT)
Facility: HOSPITAL | Age: 39
End: 2024-03-01
Payer: COMMERCIAL

## 2024-03-01 ENCOUNTER — HOSPITAL ENCOUNTER (OUTPATIENT)
Facility: HOSPITAL | Age: 39
Setting detail: OUTPATIENT SURGERY
Discharge: HOME | End: 2024-03-01
Attending: ORTHOPAEDIC SURGERY | Admitting: ORTHOPAEDIC SURGERY
Payer: COMMERCIAL

## 2024-03-01 VITALS
OXYGEN SATURATION: 100 % | WEIGHT: 148 LBS | BODY MASS INDEX: 26.22 KG/M2 | TEMPERATURE: 97 F | HEART RATE: 52 BPM | SYSTOLIC BLOOD PRESSURE: 107 MMHG | RESPIRATION RATE: 16 BRPM | HEIGHT: 63 IN | DIASTOLIC BLOOD PRESSURE: 57 MMHG

## 2024-03-01 DIAGNOSIS — S86.012A RUPTURE OF LEFT ACHILLES TENDON, INITIAL ENCOUNTER: Primary | ICD-10-CM

## 2024-03-01 LAB — HCG UR QL: NEGATIVE

## 2024-03-01 PROCEDURE — 2709999900 HC NON-CHARGEABLE SUPPLY: Performed by: ORTHOPAEDIC SURGERY

## 2024-03-01 PROCEDURE — 2500000003 HC RX 250 WO HCPCS: Performed by: NURSE ANESTHETIST, CERTIFIED REGISTERED

## 2024-03-01 PROCEDURE — 6360000002 HC RX W HCPCS: Performed by: STUDENT IN AN ORGANIZED HEALTH CARE EDUCATION/TRAINING PROGRAM

## 2024-03-01 PROCEDURE — 6360000002 HC RX W HCPCS: Performed by: NURSE ANESTHETIST, CERTIFIED REGISTERED

## 2024-03-01 PROCEDURE — 2580000003 HC RX 258: Performed by: STUDENT IN AN ORGANIZED HEALTH CARE EDUCATION/TRAINING PROGRAM

## 2024-03-01 PROCEDURE — 2580000003 HC RX 258: Performed by: ORTHOPAEDIC SURGERY

## 2024-03-01 PROCEDURE — 7100000001 HC PACU RECOVERY - ADDTL 15 MIN: Performed by: ORTHOPAEDIC SURGERY

## 2024-03-01 PROCEDURE — 3700000000 HC ANESTHESIA ATTENDED CARE: Performed by: ORTHOPAEDIC SURGERY

## 2024-03-01 PROCEDURE — 3600000012 HC SURGERY LEVEL 2 ADDTL 15MIN: Performed by: ORTHOPAEDIC SURGERY

## 2024-03-01 PROCEDURE — 3600000002 HC SURGERY LEVEL 2 BASE: Performed by: ORTHOPAEDIC SURGERY

## 2024-03-01 PROCEDURE — 7100000000 HC PACU RECOVERY - FIRST 15 MIN: Performed by: ORTHOPAEDIC SURGERY

## 2024-03-01 PROCEDURE — 6370000000 HC RX 637 (ALT 250 FOR IP): Performed by: STUDENT IN AN ORGANIZED HEALTH CARE EDUCATION/TRAINING PROGRAM

## 2024-03-01 PROCEDURE — 6360000002 HC RX W HCPCS: Performed by: ORTHOPAEDIC SURGERY

## 2024-03-01 PROCEDURE — 3700000001 HC ADD 15 MINUTES (ANESTHESIA): Performed by: ORTHOPAEDIC SURGERY

## 2024-03-01 PROCEDURE — 81025 URINE PREGNANCY TEST: CPT

## 2024-03-01 PROCEDURE — 64445 NJX AA&/STRD SCIATIC NRV IMG: CPT | Performed by: ANESTHESIOLOGY

## 2024-03-01 PROCEDURE — 6360000002 HC RX W HCPCS: Performed by: ANESTHESIOLOGY

## 2024-03-01 RX ORDER — ONDANSETRON 2 MG/ML
4 INJECTION INTRAMUSCULAR; INTRAVENOUS
Status: DISCONTINUED | OUTPATIENT
Start: 2024-03-01 | End: 2024-03-01 | Stop reason: HOSPADM

## 2024-03-01 RX ORDER — ASPIRIN 325 MG
325 TABLET ORAL DAILY
Qty: 30 TABLET | Refills: 0 | Status: SHIPPED | OUTPATIENT
Start: 2024-03-01 | End: 2024-03-31

## 2024-03-01 RX ORDER — SODIUM CHLORIDE 0.9 % (FLUSH) 0.9 %
5-40 SYRINGE (ML) INJECTION EVERY 12 HOURS SCHEDULED
Status: DISCONTINUED | OUTPATIENT
Start: 2024-03-01 | End: 2024-03-01 | Stop reason: HOSPADM

## 2024-03-01 RX ORDER — ROCURONIUM BROMIDE 10 MG/ML
INJECTION, SOLUTION INTRAVENOUS PRN
Status: DISCONTINUED | OUTPATIENT
Start: 2024-03-01 | End: 2024-03-01 | Stop reason: SDUPTHER

## 2024-03-01 RX ORDER — ACETAMINOPHEN 325 MG/1
650 TABLET ORAL EVERY 6 HOURS PRN
COMMUNITY

## 2024-03-01 RX ORDER — SODIUM CHLORIDE 9 MG/ML
INJECTION, SOLUTION INTRAVENOUS PRN
Status: DISCONTINUED | OUTPATIENT
Start: 2024-03-01 | End: 2024-03-01 | Stop reason: HOSPADM

## 2024-03-01 RX ORDER — PROCHLORPERAZINE EDISYLATE 5 MG/ML
5 INJECTION INTRAMUSCULAR; INTRAVENOUS
Status: DISCONTINUED | OUTPATIENT
Start: 2024-03-01 | End: 2024-03-01 | Stop reason: HOSPADM

## 2024-03-01 RX ORDER — OXYCODONE HYDROCHLORIDE 5 MG/1
5 TABLET ORAL
Status: DISCONTINUED | OUTPATIENT
Start: 2024-03-01 | End: 2024-03-01 | Stop reason: HOSPADM

## 2024-03-01 RX ORDER — SODIUM CHLORIDE 0.9 % (FLUSH) 0.9 %
5-40 SYRINGE (ML) INJECTION PRN
Status: DISCONTINUED | OUTPATIENT
Start: 2024-03-01 | End: 2024-03-01 | Stop reason: HOSPADM

## 2024-03-01 RX ORDER — ACETAMINOPHEN 500 MG
1000 TABLET ORAL ONCE
Status: COMPLETED | OUTPATIENT
Start: 2024-03-01 | End: 2024-03-01

## 2024-03-01 RX ORDER — LIDOCAINE HYDROCHLORIDE 20 MG/ML
INJECTION, SOLUTION EPIDURAL; INFILTRATION; INTRACAUDAL; PERINEURAL PRN
Status: DISCONTINUED | OUTPATIENT
Start: 2024-03-01 | End: 2024-03-01 | Stop reason: SDUPTHER

## 2024-03-01 RX ORDER — CEPHALEXIN 500 MG/1
500 CAPSULE ORAL 4 TIMES DAILY
Qty: 4 CAPSULE | Refills: 0 | Status: SHIPPED | OUTPATIENT
Start: 2024-03-01 | End: 2024-03-02

## 2024-03-01 RX ORDER — DEXAMETHASONE SODIUM PHOSPHATE 4 MG/ML
INJECTION, SOLUTION INTRA-ARTICULAR; INTRALESIONAL; INTRAMUSCULAR; INTRAVENOUS; SOFT TISSUE PRN
Status: DISCONTINUED | OUTPATIENT
Start: 2024-03-01 | End: 2024-03-01 | Stop reason: SDUPTHER

## 2024-03-01 RX ORDER — SODIUM CHLORIDE, SODIUM LACTATE, POTASSIUM CHLORIDE, CALCIUM CHLORIDE 600; 310; 30; 20 MG/100ML; MG/100ML; MG/100ML; MG/100ML
INJECTION, SOLUTION INTRAVENOUS CONTINUOUS
Status: DISCONTINUED | OUTPATIENT
Start: 2024-03-01 | End: 2024-03-01 | Stop reason: HOSPADM

## 2024-03-01 RX ORDER — FENTANYL CITRATE 50 UG/ML
100 INJECTION, SOLUTION INTRAMUSCULAR; INTRAVENOUS
Status: COMPLETED | OUTPATIENT
Start: 2024-03-01 | End: 2024-03-01

## 2024-03-01 RX ORDER — IBUPROFEN 600 MG/1
600 TABLET ORAL EVERY 6 HOURS PRN
Status: ON HOLD | COMMUNITY
End: 2024-03-01 | Stop reason: HOSPADM

## 2024-03-01 RX ORDER — SUCCINYLCHOLINE/SOD CL,ISO/PF 200MG/10ML
SYRINGE (ML) INTRAVENOUS PRN
Status: DISCONTINUED | OUTPATIENT
Start: 2024-03-01 | End: 2024-03-01 | Stop reason: SDUPTHER

## 2024-03-01 RX ORDER — FENTANYL CITRATE 50 UG/ML
INJECTION, SOLUTION INTRAMUSCULAR; INTRAVENOUS PRN
Status: DISCONTINUED | OUTPATIENT
Start: 2024-03-01 | End: 2024-03-01 | Stop reason: SDUPTHER

## 2024-03-01 RX ORDER — HYDROMORPHONE HYDROCHLORIDE 1 MG/ML
0.5 INJECTION, SOLUTION INTRAMUSCULAR; INTRAVENOUS; SUBCUTANEOUS EVERY 5 MIN PRN
Status: DISCONTINUED | OUTPATIENT
Start: 2024-03-01 | End: 2024-03-01 | Stop reason: HOSPADM

## 2024-03-01 RX ORDER — PROPOFOL 10 MG/ML
INJECTION, EMULSION INTRAVENOUS PRN
Status: DISCONTINUED | OUTPATIENT
Start: 2024-03-01 | End: 2024-03-01 | Stop reason: SDUPTHER

## 2024-03-01 RX ORDER — FENTANYL CITRATE 50 UG/ML
25 INJECTION, SOLUTION INTRAMUSCULAR; INTRAVENOUS EVERY 5 MIN PRN
Status: DISCONTINUED | OUTPATIENT
Start: 2024-03-01 | End: 2024-03-01 | Stop reason: HOSPADM

## 2024-03-01 RX ORDER — ROPIVACAINE HYDROCHLORIDE 5 MG/ML
INJECTION, SOLUTION EPIDURAL; INFILTRATION; PERINEURAL
Status: COMPLETED | OUTPATIENT
Start: 2024-03-01 | End: 2024-03-01

## 2024-03-01 RX ORDER — MIDAZOLAM HYDROCHLORIDE 2 MG/2ML
2 INJECTION, SOLUTION INTRAMUSCULAR; INTRAVENOUS
Status: COMPLETED | OUTPATIENT
Start: 2024-03-01 | End: 2024-03-01

## 2024-03-01 RX ORDER — ACETAMINOPHEN/DIPHENHYDRAMINE 500MG-25MG
1 TABLET ORAL NIGHTLY PRN
COMMUNITY

## 2024-03-01 RX ORDER — ENOXAPARIN SODIUM 100 MG/ML
40 INJECTION SUBCUTANEOUS ONCE
Status: COMPLETED | OUTPATIENT
Start: 2024-03-01 | End: 2024-03-01

## 2024-03-01 RX ORDER — ONDANSETRON 2 MG/ML
INJECTION INTRAMUSCULAR; INTRAVENOUS PRN
Status: DISCONTINUED | OUTPATIENT
Start: 2024-03-01 | End: 2024-03-01 | Stop reason: SDUPTHER

## 2024-03-01 RX ORDER — HYDRALAZINE HYDROCHLORIDE 20 MG/ML
10 INJECTION INTRAMUSCULAR; INTRAVENOUS
Status: DISCONTINUED | OUTPATIENT
Start: 2024-03-01 | End: 2024-03-01 | Stop reason: HOSPADM

## 2024-03-01 RX ORDER — HYDROCODONE BITARTRATE AND ACETAMINOPHEN 5; 325 MG/1; MG/1
1 TABLET ORAL EVERY 4 HOURS PRN
Qty: 15 TABLET | Refills: 0 | Status: SHIPPED | OUTPATIENT
Start: 2024-03-01 | End: 2024-03-04

## 2024-03-01 RX ORDER — LIDOCAINE HYDROCHLORIDE 10 MG/ML
1 INJECTION, SOLUTION EPIDURAL; INFILTRATION; INTRACAUDAL; PERINEURAL
Status: DISCONTINUED | OUTPATIENT
Start: 2024-03-01 | End: 2024-03-01 | Stop reason: HOSPADM

## 2024-03-01 RX ADMIN — FENTANYL CITRATE 50 MCG: 50 INJECTION, SOLUTION INTRAMUSCULAR; INTRAVENOUS at 13:25

## 2024-03-01 RX ADMIN — ROCURONIUM BROMIDE 10 MG: 10 INJECTION, SOLUTION INTRAVENOUS at 12:56

## 2024-03-01 RX ADMIN — ROPIVACAINE HYDROCHLORIDE 30 ML: 5 INJECTION, SOLUTION EPIDURAL; INFILTRATION; PERINEURAL at 12:40

## 2024-03-01 RX ADMIN — ACETAMINOPHEN 1000 MG: 500 TABLET ORAL at 11:43

## 2024-03-01 RX ADMIN — FENTANYL CITRATE 50 MCG: 50 INJECTION, SOLUTION INTRAMUSCULAR; INTRAVENOUS at 12:56

## 2024-03-01 RX ADMIN — WATER 2000 MG: 1 INJECTION INTRAMUSCULAR; INTRAVENOUS; SUBCUTANEOUS at 12:51

## 2024-03-01 RX ADMIN — LIDOCAINE HYDROCHLORIDE 60 MG: 20 INJECTION, SOLUTION EPIDURAL; INFILTRATION; INTRACAUDAL; PERINEURAL at 12:56

## 2024-03-01 RX ADMIN — ENOXAPARIN SODIUM 40 MG: 100 INJECTION SUBCUTANEOUS at 14:25

## 2024-03-01 RX ADMIN — Medication 140 MG: at 12:56

## 2024-03-01 RX ADMIN — ONDANSETRON 4 MG: 2 INJECTION INTRAMUSCULAR; INTRAVENOUS at 13:27

## 2024-03-01 RX ADMIN — FENTANYL CITRATE 100 MCG: 50 INJECTION INTRAMUSCULAR; INTRAVENOUS at 12:22

## 2024-03-01 RX ADMIN — PROPOFOL 200 MG: 10 INJECTION, EMULSION INTRAVENOUS at 12:56

## 2024-03-01 RX ADMIN — SODIUM CHLORIDE, POTASSIUM CHLORIDE, SODIUM LACTATE AND CALCIUM CHLORIDE: 600; 310; 30; 20 INJECTION, SOLUTION INTRAVENOUS at 12:02

## 2024-03-01 RX ADMIN — DEXAMETHASONE SODIUM PHOSPHATE 4 MG: 4 INJECTION INTRA-ARTICULAR; INTRALESIONAL; INTRAMUSCULAR; INTRAVENOUS; SOFT TISSUE at 13:17

## 2024-03-01 RX ADMIN — MIDAZOLAM HYDROCHLORIDE 5 MG: 1 INJECTION, SOLUTION INTRAMUSCULAR; INTRAVENOUS at 12:24

## 2024-03-01 ASSESSMENT — PAIN - FUNCTIONAL ASSESSMENT
PAIN_FUNCTIONAL_ASSESSMENT: 0-10
PAIN_FUNCTIONAL_ASSESSMENT: NONE - DENIES PAIN
PAIN_FUNCTIONAL_ASSESSMENT: 0-10
PAIN_FUNCTIONAL_ASSESSMENT: NONE - DENIES PAIN

## 2024-03-01 ASSESSMENT — PAIN DESCRIPTION - DESCRIPTORS: DESCRIPTORS: ACHING

## 2024-03-01 NOTE — DISCHARGE INSTRUCTIONS
No weight left leg  Maintain splint until follow up           Dr. Jenkins's Postoperative Patient Instructions    Please maintain the dressing and/or splint placed at surgery until your follow up appointment.  We will remove it at your appointment.  Please keep the dressing clean and dry.  If you have any questions or problems with your dressing, please call our office at [phone].  Please elevate the operative extremity to the level of the heart to keep swelling at a minimum.  You may get up to move around, but when seated please keep the extremity elevated as much as possible.  This will decrease swelling and postoperative pain.  If you were told to be non-weight bearing following surgery, please do not place the foot on the ground.  You may use crutches or we can help arrange for a scooter for you to stay off of your operative leg.  If you are in a special shoe or boot and told that you may bear weight as tolerated, then you may do so, but please keep the extremity elevated when not moving around.  If you had a block from the Anesthesia doctor before your surgery, expect this to wear off around 12-24 hours after you received it and you should start taking your pain medication when the feeling begins to come back into your leg.  A prescription for pain medicine is provided.  The key to pain control is staying ahead.  For the first 48-72 hours after your surgery, you may want to take your pain medication of a regular schedule.  After that, you may only need it on an as-needed basis.  Please begin taking one Enteric Coated Aspirin 325 mg daily on the morning after your surgery until you are told you do not need to do this anymore.  This can lower the risk of developing a blood clot after surgery.  If you are not sure if you can take an Aspirin daily, please check with your primary care doctor to verify.  Signs and symptoms of infection include: redness, increased pain, increased swelling not relieved by

## 2024-03-01 NOTE — OP NOTE
conducted indicating correct operative site.  Patient received IV Ancef and cephalosporin antibiotics prior to an incision being made.  Next, the leg was elevated and tourniquet was inflated.  She had an obvious ruptured Achilles with a positive Benoit sign.  We made a posterior medial incision centered at the palpable defect, dissected down through soft tissue to a complete rupture of the Achilles tendon.  The proximal and distal stumps were debrided to healthy ends and a #5 FiberWire suture was run in standard Krackow locking suture in the proximal and distal stumps.  The 2 ends were brought together under appropriate tension and tied together for repair.  The 2 suture ends were then tunneled anterior to the tendon and tied to one another so as to prevent a knot underneath the skin.  All areas were irrigated with saline.  Deep and subcuticular layer were closed with Vicryl suture, followed by nylon sutures on the skin.  The patient had an appropriate resting achilles tone with a negative Benoit sign.  Sterile dressings were placed.  A bulky Ron splint was placed on the patient holding an appropriate amount of equinus.  The patient was awoke and taken to the recovery room in stable condition.    TOURNIQUET TIME:  20 minutes left lower extremity.    DISPOSITION:  The patient was returned to the recovery room in stable condition.        MD LUIS DANIEL DOCKERY/AQS  D:  03/01/2024 13:44:41  T:  03/01/2024 15:29:53  JOB #:  [id number]

## 2024-03-01 NOTE — ANESTHESIA PROCEDURE NOTES
Peripheral Block    Patient location during procedure: pre-op  Reason for block: post-op pain management and at surgeon's request  Start time: 3/1/2024 12:23 PM  End time: 3/1/2024 12:45 PM  Staffing  Performed: anesthesiologist   Anesthesiologist: Kuldeep Montes Jr., MD  Performed by: Kuldeep Montes Jr., MD  Authorized by: Kuldeep Montes Jr., MD    Preanesthetic Checklist  Completed: patient identified, IV checked, site marked, risks and benefits discussed, surgical/procedural consents, equipment checked, pre-op evaluation, timeout performed, anesthesia consent given, oxygen available, monitors applied/VS acknowledged and fire risk safety assessment completed and verbalized  Peripheral Block   Prep: ChloraPrep  Provider prep: mask and sterile gloves  Patient monitoring: cardiac monitor, continuous pulse ox, frequent blood pressure checks, IV access and oxygen  Block type: Sciatic  Popliteal  Laterality: right  Injection technique: single-shot  Guidance: ultrasound guided  Local infiltration: ropivacaine  Infiltration strength: 0.5 %  Local infiltration: ropivacaine    Needle   Needle type: insulated echogenic nerve stimulator needle   Needle gauge: 21 G  Needle localization: anatomical landmarks and ultrasound guidance  Needle length: 10 cm  Assessment   Injection assessment: negative aspiration for heme, no paresthesia on injection, local visualized surrounding nerve on ultrasound and no intravascular symptoms  Paresthesia pain: none  Slow fractionated injection: yes  Hemodynamics: stable  Outcomes: uncomplicated and patient tolerated procedure well    Medications Administered  ropivacaine (NAROPIN) injection 0.5% - Perineural   30 mL - 3/1/2024 12:40:00 PM

## 2024-03-01 NOTE — ANESTHESIA PRE PROCEDURE
Department of Anesthesiology  Preprocedure Note       Name:  Carol Mccormick   Age:  38 y.o.  :  1985                                          MRN:  [id number]         Date:  3/1/2024      Surgeon: Surgeon(s):  Juan Jenkins MD    Procedure: Procedure(s):  LEFT ACHILLES TENDON RUPTURE REPAIR    Medications prior to admission:   Prior to Admission medications    Medication Sig Start Date End Date Taking? Authorizing Provider   acetaminophen (TYLENOL) 325 MG tablet Take 2 tablets by mouth every 6 hours as needed for Pain   Yes ProviderByron MD   ibuprofen (ADVIL;MOTRIN) 600 MG tablet Take 1 tablet by mouth every 6 hours as needed for Pain   Yes Provider, MD Byron   diphenhydrAMINE-APAP, sleep, (TYLENOL PM EXTRA STRENGTH)  MG tablet Take 1 tablet by mouth nightly as needed for Sleep   Yes Provider, MD Byron       Current medications:    Current Facility-Administered Medications   Medication Dose Route Frequency Provider Last Rate Last Admin   • lidocaine PF 1 % injection 1 mL  1 mL IntraDERmal Once PRN Cesia Rivera MD       • lactated ringers IV soln infusion   IntraVENous Continuous Cesia Rivera  mL/hr at 24 1202 New Bag at 24 1202   • sodium chloride flush 0.9 % injection 5-40 mL  5-40 mL IntraVENous 2 times per day Cesia Rivera MD       • sodium chloride flush 0.9 % injection 5-40 mL  5-40 mL IntraVENous PRN Cesia Rivera MD       • 0.9 % sodium chloride infusion   IntraVENous PRN Cesia Rievra MD       • ceFAZolin (ANCEF) 2,000 mg in sterile water 20 mL IV syringe  2,000 mg IntraVENous On Call to OR Juan Jenkins MD           Allergies:  No Known Allergies    Problem List:  There is no problem list on file for this patient.      Past Medical History:  No past medical history on file.    Past Surgical History:        Procedure Laterality Date   • IR EMBOLIZATION TUMOR / ORGAN  2021    IR EMBOLIZATION TUMOR / ORGAN 2021 [facility] RAD ANGIO IR   • IR

## 2024-03-01 NOTE — BRIEF OP NOTE
Brief Postoperative Note      Patient: Carol Mccormick  YOB: 1985  MRN: [id number]    Date of Procedure: 3/1/2024    Pre-Op Diagnosis Codes:     * Rupture of left Achilles tendon, initial encounter [S86.012A]     * Injury of left ankle, initial encounter [S99.912A]    Post-Op Diagnosis: Same       Procedure(s):  LEFT ACHILLES TENDON RUPTURE REPAIR    Surgeon(s):  Juan Jenkins MD    Assistant:  * No surgical staff found *    Anesthesia: General    Estimated Blood Loss (mL): Minimal    Complications: None    Specimens:   * No specimens in log *    Implants:  * No implants in log *      Drains: * No LDAs found *    Findings: as above      Electronically signed by Juan Jenkins MD on 3/1/2024 at 1:54 PM

## 2024-03-01 NOTE — ANESTHESIA POSTPROCEDURE EVALUATION
Department of Anesthesiology  Postprocedure Note    Patient: Carol Mccormick  MRN: [id number]  YOB: 1985  Date of evaluation: 3/1/2024    Procedure Summary       Date: 03/01/24 Room / Location: [facility] MAIN OR [address] [place] [address] MAIN OR    Anesthesia Start: 1251 Anesthesia Stop: 1407    Procedure: LEFT ACHILLES TENDON RUPTURE REPAIR (Left: Foot) Diagnosis:       Rupture of left Achilles tendon, initial encounter      Injury of left ankle, initial encounter      (Rupture of left Achilles tendon, initial encounter [S86.012A])      (Injury of left ankle, initial encounter [S99.912A])    Providers: Juan Jenkins MD Responsible Provider: Beth Root DO    Anesthesia Type: General, Regional ASA Status: 2            Anesthesia Type: General, Regional    Zena Phase I: Zena Score: 8    Zena Phase II:      Anesthesia Post Evaluation    Patient location during evaluation: PACU  Level of consciousness: awake  Airway patency: patent  Nausea & Vomiting: no nausea  Cardiovascular status: hemodynamically stable  Respiratory status: acceptable  Hydration status: stable  Multimodal analgesia pain management approach  Pain management: adequate    No notable events documented.

## 2025-01-29 ENCOUNTER — OFFICE VISIT (OUTPATIENT)
Facility: CLINIC | Age: 40
End: 2025-01-29

## 2025-01-29 VITALS
HEART RATE: 81 BPM | TEMPERATURE: 98.6 F | DIASTOLIC BLOOD PRESSURE: 72 MMHG | SYSTOLIC BLOOD PRESSURE: 110 MMHG | BODY MASS INDEX: 25.8 KG/M2 | WEIGHT: 145.6 LBS | OXYGEN SATURATION: 99 % | RESPIRATION RATE: 18 BRPM | HEIGHT: 63 IN

## 2025-01-29 DIAGNOSIS — Z76.89 ENCOUNTER TO ESTABLISH CARE: Primary | ICD-10-CM

## 2025-01-29 DIAGNOSIS — Z11.59 NEED FOR HEPATITIS C SCREENING TEST: ICD-10-CM

## 2025-01-29 DIAGNOSIS — Z00.00 ENCOUNTER FOR PREVENTIVE HEALTH EXAMINATION: ICD-10-CM

## 2025-01-29 DIAGNOSIS — Z11.4 SCREENING FOR HIV WITHOUT PRESENCE OF RISK FACTORS: ICD-10-CM

## 2025-01-29 DIAGNOSIS — F41.9 ANXIETY: ICD-10-CM

## 2025-01-29 RX ORDER — BUPROPION HYDROCHLORIDE 150 MG/1
150 TABLET ORAL EVERY MORNING
Qty: 30 TABLET | Refills: 3 | Status: SHIPPED | OUTPATIENT
Start: 2025-01-29

## 2025-01-29 SDOH — ECONOMIC STABILITY: FOOD INSECURITY: WITHIN THE PAST 12 MONTHS, THE FOOD YOU BOUGHT JUST DIDN'T LAST AND YOU DIDN'T HAVE MONEY TO GET MORE.: NEVER TRUE

## 2025-01-29 SDOH — ECONOMIC STABILITY: FOOD INSECURITY: WITHIN THE PAST 12 MONTHS, YOU WORRIED THAT YOUR FOOD WOULD RUN OUT BEFORE YOU GOT MONEY TO BUY MORE.: NEVER TRUE

## 2025-01-29 ASSESSMENT — ANXIETY QUESTIONNAIRES
3. WORRYING TOO MUCH ABOUT DIFFERENT THINGS: NEARLY EVERY DAY
6. BECOMING EASILY ANNOYED OR IRRITABLE: NEARLY EVERY DAY
2. NOT BEING ABLE TO STOP OR CONTROL WORRYING: NEARLY EVERY DAY
4. TROUBLE RELAXING: NEARLY EVERY DAY
5. BEING SO RESTLESS THAT IT IS HARD TO SIT STILL: NEARLY EVERY DAY
IF YOU CHECKED OFF ANY PROBLEMS ON THIS QUESTIONNAIRE, HOW DIFFICULT HAVE THESE PROBLEMS MADE IT FOR YOU TO DO YOUR WORK, TAKE CARE OF THINGS AT HOME, OR GET ALONG WITH OTHER PEOPLE: EXTREMELY DIFFICULT
GAD7 TOTAL SCORE: 17
7. FEELING AFRAID AS IF SOMETHING AWFUL MIGHT HAPPEN: NOT AT ALL
1. FEELING NERVOUS, ANXIOUS, OR ON EDGE: MORE THAN HALF THE DAYS

## 2025-01-29 ASSESSMENT — ENCOUNTER SYMPTOMS
COUGH: 0
SHORTNESS OF BREATH: 0

## 2025-01-29 ASSESSMENT — PATIENT HEALTH QUESTIONNAIRE - PHQ9
SUM OF ALL RESPONSES TO PHQ9 QUESTIONS 1 & 2: 2
SUM OF ALL RESPONSES TO PHQ QUESTIONS 1-9: 2
1. LITTLE INTEREST OR PLEASURE IN DOING THINGS: SEVERAL DAYS
SUM OF ALL RESPONSES TO PHQ QUESTIONS 1-9: 2
2. FEELING DOWN, DEPRESSED OR HOPELESS: SEVERAL DAYS

## 2025-01-29 NOTE — ASSESSMENT & PLAN NOTE
-Endorses symptoms of anxiety  -LEAH-7: 17  -Previously on escitalopram, but inconsistently  -Will start Wellbutrin  mg; can increase to 300 mg after 4 days prn  -Will send neuropsych referral for ADHD evaluation    Orders:    buPROPion (WELLBUTRIN XL) 150 MG extended release tablet; Take 1 tablet by mouth every morning Can increase to 2 tablets after 4 days if needed    [facility] - Cathleen Duarte PsyD, NeuropsychologyMichael (Dez Hooks)

## 2025-01-29 NOTE — PROGRESS NOTES
2025    Carol Mccormick (:  1985) is a 39 y.o. female, here for a preventive medicine evaluation.    Subjective   There is no problem list on file for this patient.    Patient is new to this provider    PMHx: anxiety, depression     Health maintenance  HIV screen: Patient is amenable  Hep C screen: Patient is amenable     Cervical cancer screening  Patient is due for cervical cancer screening.  She says that she had a Pap test in  with normal findings.      Memory Issues/Concerns  Patient reports having some concerns with her memory, mainly short term for the past year.  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  She mentions that she recently reached out to initiate therapy and connects with her support system.      Anxiety  Patient reports having symptoms associated with anxiety, excessive worrying and trouble relaxing.  She states that as a result she has increased smoking marijuana and vaping tobacco.  She says that this has increased recently.  Patient mentions that she previously prescribed a medication (escitalopram 10 mg) to help manage her symptoms, but states that she only took a few.   Patient also mentions that she took some ADHD medications in the past and noticed that it helped her to focus on her tasks.      LEAH-7: 17     Review of Systems   Constitutional:  Negative for activity change and appetite change.   HENT:  Negative for congestion.    Respiratory:  Negative for cough and shortness of breath.    Musculoskeletal:  Negative for arthralgias.   Psychiatric/Behavioral:  Positive for decreased concentration. The patient is nervous/anxious.    All other systems reviewed and are negative.      Prior to Visit Medications    Medication Sig Taking? Authorizing Provider   acetaminophen (TYLENOL) 325 MG tablet Take 2 tablets by mouth every 6 hours as needed for Pain  Provider, MD Byron   diphenhydrAMINE-APAP, sleep, (TYLENOL PM EXTRA STRENGTH)  MG tablet Take 1

## 2025-01-30 LAB
25(OH)D3 SERPL-MCNC: 12 NG/ML (ref 30–100)
ALBUMIN SERPL-MCNC: 4.3 G/DL (ref 3.5–5)
ALBUMIN/GLOB SERPL: 1.4 (ref 1.1–2.2)
ALP SERPL-CCNC: 55 U/L (ref 45–117)
ALT SERPL-CCNC: 20 U/L (ref 12–78)
ANION GAP SERPL CALC-SCNC: 5 MMOL/L (ref 2–12)
AST SERPL-CCNC: 15 U/L (ref 15–37)
BASOPHILS # BLD: 0.08 K/UL (ref 0–0.1)
BASOPHILS NFR BLD: 1 % (ref 0–1)
BILIRUB SERPL-MCNC: 0.5 MG/DL (ref 0.2–1)
BUN SERPL-MCNC: 13 MG/DL (ref 6–20)
BUN/CREAT SERPL: 15 (ref 12–20)
CALCIUM SERPL-MCNC: 9.6 MG/DL (ref 8.5–10.1)
CHLORIDE SERPL-SCNC: 107 MMOL/L (ref 97–108)
CHOLEST SERPL-MCNC: 205 MG/DL
CO2 SERPL-SCNC: 24 MMOL/L (ref 21–32)
CREAT SERPL-MCNC: 0.87 MG/DL (ref 0.55–1.02)
DIFFERENTIAL METHOD BLD: ABNORMAL
EOSINOPHIL # BLD: 0.13 K/UL (ref 0–0.4)
EOSINOPHIL NFR BLD: 1.6 % (ref 0–7)
ERYTHROCYTE [DISTWIDTH] IN BLOOD BY AUTOMATED COUNT: 13 % (ref 11.5–14.5)
EST. AVERAGE GLUCOSE BLD GHB EST-MCNC: 100 MG/DL
GLOBULIN SER CALC-MCNC: 3.1 G/DL (ref 2–4)
GLUCOSE SERPL-MCNC: 90 MG/DL (ref 65–100)
HBA1C MFR BLD: 5.1 % (ref 4–5.6)
HCT VFR BLD AUTO: 44.3 % (ref 35–47)
HCV AB SER IA-ACNC: 0.12 INDEX
HCV AB SERPL QL IA: NONREACTIVE
HDLC SERPL-MCNC: 83 MG/DL
HDLC SERPL: 2.5 (ref 0–5)
HGB BLD-MCNC: 13.9 G/DL (ref 11.5–16)
HIV 1+2 AB+HIV1 P24 AG SERPL QL IA: NONREACTIVE
HIV 1/2 RESULT COMMENT: NORMAL
IMM GRANULOCYTES # BLD AUTO: 0.01 K/UL (ref 0–0.04)
IMM GRANULOCYTES NFR BLD AUTO: 0.1 % (ref 0–0.5)
LDLC SERPL CALC-MCNC: 111.2 MG/DL (ref 0–100)
LYMPHOCYTES # BLD: 1.74 K/UL (ref 0.8–3.5)
LYMPHOCYTES NFR BLD: 21.1 % (ref 12–49)
MCH RBC QN AUTO: 28.3 PG (ref 26–34)
MCHC RBC AUTO-ENTMCNC: 31.4 G/DL (ref 30–36.5)
MCV RBC AUTO: 90.2 FL (ref 80–99)
MONOCYTES # BLD: 0.39 K/UL (ref 0–1)
MONOCYTES NFR BLD: 4.7 % (ref 5–13)
NEUTS SEG # BLD: 5.88 K/UL (ref 1.8–8)
NEUTS SEG NFR BLD: 71.5 % (ref 32–75)
NRBC # BLD: 0 K/UL (ref 0–0.01)
NRBC BLD-RTO: 0 PER 100 WBC
PLATELET # BLD AUTO: 271 K/UL (ref 150–400)
PMV BLD AUTO: 11.6 FL (ref 8.9–12.9)
POTASSIUM SERPL-SCNC: 4.8 MMOL/L (ref 3.5–5.1)
PROT SERPL-MCNC: 7.4 G/DL (ref 6.4–8.2)
RBC # BLD AUTO: 4.91 M/UL (ref 3.8–5.2)
SODIUM SERPL-SCNC: 136 MMOL/L (ref 136–145)
T4 FREE SERPL-MCNC: 0.9 NG/DL (ref 0.8–1.5)
TRIGL SERPL-MCNC: 54 MG/DL
TSH SERPL DL<=0.05 MIU/L-ACNC: 0.36 UIU/ML (ref 0.36–3.74)
VLDLC SERPL CALC-MCNC: 10.8 MG/DL
WBC # BLD AUTO: 8.2 K/UL (ref 3.6–11)

## 2025-01-31 DIAGNOSIS — E55.9 VITAMIN D DEFICIENCY: Primary | ICD-10-CM

## 2025-01-31 RX ORDER — ERGOCALCIFEROL 1.25 MG/1
50000 CAPSULE, LIQUID FILLED ORAL WEEKLY
Qty: 12 CAPSULE | Refills: 4 | Status: SHIPPED | OUTPATIENT
Start: 2025-01-31

## (undated) DEVICE — DRESSING,GAUZE,XEROFORM,CURAD,1"X8",ST: Brand: CURAD

## (undated) DEVICE — GLOVE SURG SZ 8 L12IN FNGR THK79MIL GRN LTX FREE

## (undated) DEVICE — PILLOW OR POS AD L5IN R INTUB FOAM HDRST SLOT DISP GENTLE

## (undated) DEVICE — BLADE,CARBON-STEEL,15,STRL,DISPOSABLE,TB: Brand: MEDLINE

## (undated) DEVICE — GLOVE ORTHO 8   MSG9480

## (undated) DEVICE — SOLUTION IRRIG 1000ML 0.9% SOD CHL USP POUR PLAS BTL

## (undated) DEVICE — PADDING CAST W6INXL4YD ST COT RAYON MICROPLEATED HIGHLY

## (undated) DEVICE — PAD,ABDOMINAL,5"X9",ST,LF,25/BX: Brand: MEDLINE INDUSTRIES, INC.

## (undated) DEVICE — SUTURE MCRYL SZ 3-0 L27IN ABSRB UD L19MM PS-2 3/8 CIR PRIM Y427H

## (undated) DEVICE — DRAPE CARM MINI FOR IMAG SYS INSIGHT FLROSCN

## (undated) DEVICE — SUTURE VCRL 0 L27IN ABSRB VLT CT L40MM 1/2 CIR TAPERPOINT J352H

## (undated) DEVICE — 4-PORT MANIFOLD: Brand: NEPTUNE 2

## (undated) DEVICE — GOWN,SIRUS,NONRNF,SETINSLV,2XL,18/CS: Brand: MEDLINE

## (undated) DEVICE — SUTURE FIBERWIRE SZ 2 W/ TAPERED NEEDLE BLUE L38IN NONABSORB BLU L26.5MM 1/2 CIRCLE AR7200

## (undated) DEVICE — BANDAGE COMPR M W6INXL10YD WHT BGE VELC E MTRX HK AND LOOP

## (undated) DEVICE — EXTREMITY - SMH: Brand: MEDLINE INDUSTRIES, INC.

## (undated) DEVICE — GLOVE SURG SZ 8 L12IN FNGR THK94MIL STD WHT LTX FREE

## (undated) DEVICE — SOLUTION SURG PREP 26 CC PURPREP

## (undated) DEVICE — SPONGE GZ W4XL4IN COT 12 PLY TYP VII WVN C FLD DSGN STERILE

## (undated) DEVICE — PADDING UNDERCAST W4INXL12FT RAYON POLY SYN NONADHESIVE

## (undated) DEVICE — BANDAGE,ELASTIC,ESMARK,STERILE,6"X9',LF: Brand: MEDLINE